# Patient Record
Sex: FEMALE | Race: AMERICAN INDIAN OR ALASKA NATIVE
[De-identification: names, ages, dates, MRNs, and addresses within clinical notes are randomized per-mention and may not be internally consistent; named-entity substitution may affect disease eponyms.]

---

## 2022-01-27 ENCOUNTER — HOSPITAL ENCOUNTER (EMERGENCY)
Dept: HOSPITAL 5 - ED | Age: 52
Discharge: HOME | End: 2022-01-27
Payer: COMMERCIAL

## 2022-01-27 VITALS — SYSTOLIC BLOOD PRESSURE: 146 MMHG | DIASTOLIC BLOOD PRESSURE: 88 MMHG

## 2022-01-27 DIAGNOSIS — K86.89: ICD-10-CM

## 2022-01-27 DIAGNOSIS — R11.2: ICD-10-CM

## 2022-01-27 DIAGNOSIS — K50.00: ICD-10-CM

## 2022-01-27 DIAGNOSIS — F17.200: ICD-10-CM

## 2022-01-27 DIAGNOSIS — R10.13: Primary | ICD-10-CM

## 2022-01-27 LAB
ALBUMIN SERPL-MCNC: 4.1 G/DL (ref 3.9–5)
ALT SERPL-CCNC: 15 UNITS/L (ref 7–56)
BASOPHILS # (AUTO): 0 K/MM3 (ref 0–0.1)
BASOPHILS NFR BLD AUTO: 0.5 % (ref 0–1.8)
BILIRUB UR QL STRIP: (no result)
BLOOD UR QL VISUAL: (no result)
BUN SERPL-MCNC: 10 MG/DL (ref 7–17)
BUN/CREAT SERPL: 17 %
CALCIUM SERPL-MCNC: 9.4 MG/DL (ref 8.4–10.2)
EOSINOPHIL # BLD AUTO: 0 K/MM3 (ref 0–0.4)
EOSINOPHIL NFR BLD AUTO: 0.7 % (ref 0–4.3)
HCT VFR BLD CALC: 44 % (ref 30.3–42.9)
HEMOLYSIS INDEX: 3
HGB BLD-MCNC: 13.8 GM/DL (ref 10.1–14.3)
LYMPHOCYTES # BLD AUTO: 1.2 K/MM3 (ref 1.2–5.4)
LYMPHOCYTES NFR BLD AUTO: 20.8 % (ref 13.4–35)
MCHC RBC AUTO-ENTMCNC: 31 % (ref 30–34)
MCV RBC AUTO: 89 FL (ref 79–97)
MONOCYTES # (AUTO): 0.3 K/MM3 (ref 0–0.8)
MONOCYTES % (AUTO): 5.7 % (ref 0–7.3)
PH UR STRIP: 9 [PH] (ref 5–7)
PLATELET # BLD: 320 K/MM3 (ref 140–440)
PROT UR STRIP-MCNC: (no result) MG/DL
RBC # BLD AUTO: 4.95 M/MM3 (ref 3.65–5.03)
RBC #/AREA URNS HPF: 10 /HPF (ref 0–6)
UROBILINOGEN UR-MCNC: < 2 MG/DL (ref ?–2)
WBC #/AREA URNS HPF: 2 /HPF (ref 0–6)

## 2022-01-27 PROCEDURE — 85025 COMPLETE CBC W/AUTO DIFF WBC: CPT

## 2022-01-27 PROCEDURE — 83690 ASSAY OF LIPASE: CPT

## 2022-01-27 PROCEDURE — 36415 COLL VENOUS BLD VENIPUNCTURE: CPT

## 2022-01-27 PROCEDURE — 99285 EMERGENCY DEPT VISIT HI MDM: CPT

## 2022-01-27 PROCEDURE — 96374 THER/PROPH/DIAG INJ IV PUSH: CPT

## 2022-01-27 PROCEDURE — 96361 HYDRATE IV INFUSION ADD-ON: CPT

## 2022-01-27 PROCEDURE — 93005 ELECTROCARDIOGRAM TRACING: CPT

## 2022-01-27 PROCEDURE — 80053 COMPREHEN METABOLIC PANEL: CPT

## 2022-01-27 PROCEDURE — 81001 URINALYSIS AUTO W/SCOPE: CPT

## 2022-01-27 PROCEDURE — 96375 TX/PRO/DX INJ NEW DRUG ADDON: CPT

## 2022-01-27 PROCEDURE — 96376 TX/PRO/DX INJ SAME DRUG ADON: CPT

## 2022-01-27 PROCEDURE — 74177 CT ABD & PELVIS W/CONTRAST: CPT

## 2022-01-27 PROCEDURE — 93010 ELECTROCARDIOGRAM REPORT: CPT

## 2022-01-27 NOTE — EMERGENCY DEPARTMENT REPORT
ED Abdominal Pain HPI





- General


Chief Complaint: Abdominal Pain


Stated Complaint: ABD PAIN,VOMITING


Time Seen by Provider: 01/27/22 11:18


Source: patient, EMS


Mode of arrival: Stretcher


Limitations: No Limitations





- History of Present Illness


Initial Comments: 





51-year-old female with a past medical history of a "ulcer as a teenager" 

presents to the hospital complaining of severe cramping epigastric pain with 

nausea, vomiting, p.o. intolerance that started several hours prior to arrival. 

Pain was initially 10/10 intensity but currently 8/10 intensity.  Worse 

palpation.  No alleviating factors.  Patient denies diarrhea, fever, 

hematemesis, or dysuria.  Patient is not vaccinated for Covid but did have Covid

infection in the same


Severity scale (0 -10): 10





- Related Data


                                  Previous Rx's











 Medication  Instructions  Recorded  Last Taken  Type


 


Ciprofloxacin HCl 500 mg PO BID #10 tab 01/27/22 Unknown Rx


 


Famotidine [Pepcid] 20 mg PO BID #30 tablet 01/27/22 Unknown Rx


 


Promethazine [Phenergan] 25 mg PO Q6HR PRN #20 tab 01/27/22 Unknown Rx


 


metroNIDAZOLE [Flagyl] 500 mg PO Q12HR #10 tab 01/27/22 Unknown Rx


 


traMADoL [Ultram 50 MG tab] 50 mg PO Q6HR PRN #14 tablet 01/27/22 Unknown Rx











                                    Allergies











Allergy/AdvReac Type Severity Reaction Status Date / Time


 


No Known Allergies Allergy   Unverified 01/27/22 10:57














ED Review of Systems


ROS: 


Stated complaint: ABD PAIN,VOMITING


Other details as noted in HPI





Comment: All other systems reviewed and negative





ED Past Medical Hx





- Past Medical History


Previous Medical History?: No





- Surgical History


Additional Surgical History: Tubal ligation





- Social History


Smoking Status: Current Some Day Smoker


Substance Use Type: None





- Medications


Home Medications: 


                                Home Medications











 Medication  Instructions  Recorded  Confirmed  Last Taken  Type


 


Ciprofloxacin HCl 500 mg PO BID #10 tab 01/27/22  Unknown Rx


 


Famotidine [Pepcid] 20 mg PO BID #30 tablet 01/27/22  Unknown Rx


 


Promethazine [Phenergan] 25 mg PO Q6HR PRN #20 tab 01/27/22  Unknown Rx


 


metroNIDAZOLE [Flagyl] 500 mg PO Q12HR #10 tab 01/27/22  Unknown Rx


 


traMADoL [Ultram 50 MG tab] 50 mg PO Q6HR PRN #14 tablet 01/27/22  Unknown Rx














ED Physical Exam





- General


Limitations: No Limitations





- Other


Other exam information: 





General: Mild distress secondary to pain cramping episode


Head: Atraumatic


Eyes: normal appearance


ENT: Moist mucous membranes


Neck: Normal appearance, no midline tenderness


Chest: Clear to auscultation bilaterally


CV: Regular rate and rhythm


Abdomen: Soft, normal bowel sounds, epigastric tenderness, no rebound or 

guarding


Back: Normal inspection


Extremity: Normal inspection, full range of motion


Neuro: Alert O x 3, no facial asymmetry, speech clear, no gross motor sensory 

deficit


Psych: Appropriate behavior


Skin: No rash





ED Course


                                   Vital Signs











  01/27/22 01/27/22 01/27/22





  10:15 11:05 15:45


 


Temperature 98.6 F  98.3 F


 


Pulse Rate 74  63


 


Respiratory 14  18





Rate   


 


Blood Pressure 162/94  141/94





[Left]   


 


O2 Sat by Pulse 100 95 97





Oximetry   














- Consultations


Consultation #1: 





01/27/22 16:00


case d/w DR, [facility] GI, rec cipro/flagyl and outpt f/u since labs nl and sx 

improved.





ED Medical Decision Making





- Lab Data


Result diagrams: 


                                 01/27/22 13:06





                                 01/27/22 13:06








                                   Lab Results











  01/27/22 01/27/22 01/27/22 Range/Units





  11:09 13:06 13:06 


 


WBC   5.7   (4.5-11.0)  K/mm3


 


RBC   4.95   (3.65-5.03)  M/mm3


 


Hgb   13.8   (10.1-14.3)  gm/dl


 


Hct   44.0 H   (30.3-42.9)  %


 


MCV   89   (79-97)  fl


 


MCH   28   (28-32)  pg


 


MCHC   31   (30-34)  %


 


RDW   13.8   (13.2-15.2)  %


 


Plt Count   320   (140-440)  K/mm3


 


Lymph % (Auto)   20.8   (13.4-35.0)  %


 


Mono % (Auto)   5.7   (0.0-7.3)  %


 


Eos % (Auto)   0.7   (0.0-4.3)  %


 


Baso % (Auto)   0.5   (0.0-1.8)  %


 


Lymph # (Auto)   1.2   (1.2-5.4)  K/mm3


 


Mono # (Auto)   0.3   (0.0-0.8)  K/mm3


 


Eos # (Auto)   0.0   (0.0-0.4)  K/mm3


 


Baso # (Auto)   0.0   (0.0-0.1)  K/mm3


 


Seg Neutrophils %   72.3 H   (40.0-70.0)  %


 


Seg Neutrophils #   4.1   (1.8-7.7)  K/mm3


 


Sodium    137  (137-145)  mmol/L


 


Potassium    3.9  (3.6-5.0)  mmol/L


 


Chloride    98.0  ()  mmol/L


 


Carbon Dioxide    29  (22-30)  mmol/L


 


Anion Gap    14  mmol/L


 


BUN    10  (7-17)  mg/dL


 


Creatinine    0.6  (0.6-1.2)  mg/dL


 


Estimated GFR    > 60  ml/min


 


BUN/Creatinine Ratio    17  %


 


Glucose    109 H  ()  mg/dL


 


Calcium    9.4  (8.4-10.2)  mg/dL


 


Total Bilirubin    0.40  (0.1-1.2)  mg/dL


 


AST    15  (5-40)  units/L


 


ALT    15  (7-56)  units/L


 


Alkaline Phosphatase    91  ()  units/L


 


Total Protein    7.5  (6.3-8.2)  g/dL


 


Albumin    4.1  (3.9-5)  g/dL


 


Albumin/Globulin Ratio    1.2  %


 


Lipase    22  (13-60)  units/L


 


Urine Color  Straw    (Yellow)  


 


Urine Turbidity  Clear    (Clear)  


 


Urine pH  9.0 H    (5.0-7.0)  


 


Ur Specific Gravity  1.006    (1.003-1.030)  


 


Urine Protein  <15 mg/dl    (Negative)  mg/dL


 


Urine Glucose (UA)  Neg    (Negative)  mg/dL


 


Urine Ketones  Neg    (Negative)  mg/dL


 


Urine Blood  Neg    (Negative)  


 


Urine Nitrite  Neg    (Negative)  


 


Urine Bilirubin  Neg    (Negative)  


 


Urine Urobilinogen  < 2.0    (<2.0)  mg/dL


 


Ur Leukocyte Esterase  Neg    (Negative)  


 


Urine WBC (Auto)  2.0    (0.0-6.0)  /HPF


 


Urine RBC (Auto)  10.0    (0.0-6.0)  /HPF














- EKG Data


-: EKG Interpreted by Me


EKG shows normal: sinus rhythm, intervals (qtc 432), QRS complexes (qrsd 85), 

ST-T waves (no stemi)


Rate: normal





- Radiology Data


Radiology results: report reviewed





CT abdomen pelvis w con  


 


 INDICATION / CLINICAL INFORMATION: upper abd pain, n,v  100ml omni 300.  


 


 TECHNIQUE: Axial CT images were obtained through the abdomen and pelvis after 

100 cc of Omnipaque 


300 IV contrast.  All CT scans at this location are performed using CT dose 

reduction for ALARA by 


means of automated exposure control.   


 


 COMPARISON: None available.  


 


 FINDINGS:  


 


 LOWER CHEST: No significant abnormality  


 LIVER: Nonspecific mild hepatomegaly, measuring 19 cm in craniocaudal 

dimension. There is no focal 


hepatic lesion.  


 GALLBLADDER/BILIARY TREE: Gallbladder appears unremarkable by CT. There is no 

biliary dilatation.    


 PANCREAS: Short segment focal prominence of the pancreatic duct at the head, 

measuring 7 mm (series


2 image 58). No downstream pancreatic duct dilatation. Pancreas appears 

otherwise unremarkable. No 


acute inflammation.  


 SPLEEN: No significant abnormality  


 ADRENALS: No significant abnormality  


 KIDNEYS / URETER: No significant abnormality  


 URINARY BLADDER: Bladder is partially decompressed, though grossly 

unremarkable.  


 REPRODUCTIVE ORGANS: No significant abnormality  


 STOMACH / BOWEL: Prominent but nondilated loops of small bowel are present 

within the abdomen. 


There is no abrupt transition to suggest obstruction. Colonic diverticulosis 

without evidence of 


diverticulitis. The appendix is normal in caliber.  


 LYMPH NODES: No significant adenopathy.  


 VASCULATURE: No significant abnormality.   


 OTHER: No free air, free fluid, or focal fluid collection is identified.  


 


 SKELETAL SYSTEM: Degenerative changes of the spine. No acute osseous findings. 

 


 


 IMPRESSION:  


 


 1. Nonspecific prominent but nondilated loops of small bowel within the 

abdomen, likely reflecting 


an acute infectious or inflammatory enteritis. No evidence of obstruction.  


 2. Mild focal prominence of the pancreatic duct at the head of the pancreas. 

Findings could reflect


sequela of prior pancreatitis, though main duct intraductal papillary mucinous 

neoplasm may have a 


similar appearance. Recommend GI consultation with consideration for EUS/ERCP 

and/or MRI/MRCP.  


 3. Other incidental findings as above.  





- Medical Decision Making





51-year-old female presents to the hospital with nausea, vomiting, epigastric 

abdominal pain.  Labs normal.  CT revealing possible bowel inflammation and 

pancreatic duct abnormality needing further outpatient evaluation.  Case 

discussed with GI.  Cipro and Flagyl will be prescribed.  Patient received 1 

dose of Levaquin and Flagyl in the ED.  Symptoms improved with pain medication, 

antiemetics, and H2 blocker.  Patient will be discharged with follow-up


Critical Care Time: No


Critical care attestation.: 


If time is entered above; I have spent that time in minutes in the direct care 

of this critically ill patient, excluding procedure time.








ED Disposition


Clinical Impression: 


 Epigastric pain, Nausea and vomiting, Pancreatic duct dilated, Inflammation of 

small intestine





Disposition: 01 HOME / SELF CARE / HOMELESS


Is pt being admited?: No


Does the pt Need Aspirin: No


Condition: Stable


Instructions:  Abdominal Pain (ED), Nausea and Vomiting, Adult, Abdominal Pain, 

Adult


Additional Instructions: 


Take the medication as prescribed.  Follow-up with your doctor or doctor/clinic 

provided.  Return if symptoms worsen as indicated by your discharge in

structions.


Prescriptions: 


Ciprofloxacin HCl 500 mg PO BID #10 tab


metroNIDAZOLE [Flagyl] 500 mg PO Q12HR #10 tab


Famotidine [Pepcid] 20 mg PO BID #30 tablet


Promethazine [Phenergan] 25 mg PO Q6HR PRN #20 tab


 PRN Reason: Nausea


traMADoL [Ultram 50 MG tab] 50 mg PO Q6HR PRN #14 tablet


 PRN Reason: Pain


Referrals: 


CLARITZA KLINE MD [Primary Care Provider] - 3-5 Days


MANNY JERNIGAN MD [Staff Physician] - 3-5 Days


(GI MD


)


Time of Disposition: 16:34

## 2022-01-27 NOTE — CAT SCAN REPORT
CT abdomen pelvis w con



INDICATION / CLINICAL INFORMATION: upper abd pain, n,v  100ml omni 300.



TECHNIQUE: Axial CT images were obtained through the abdomen and pelvis after 100 cc of Omnipaque 300
 IV contrast.  All CT scans at this location are performed using CT dose reduction for ALARA by means
 of automated exposure control. 



COMPARISON: None available.



FINDINGS:



LOWER CHEST: No significant abnormality

LIVER: Nonspecific mild hepatomegaly, measuring 19 cm in craniocaudal dimension. There is no focal he
patic lesion.

GALLBLADDER/BILIARY TREE: Gallbladder appears unremarkable by CT. There is no biliary dilatation.  

PANCREAS: Short segment focal prominence of the pancreatic duct at the head, measuring 7 mm (series 2
 image 58). No downstream pancreatic duct dilatation. Pancreas appears otherwise unremarkable. No acu
te inflammation.

SPLEEN: No significant abnormality

ADRENALS: No significant abnormality

KIDNEYS / URETER: No significant abnormality

URINARY BLADDER: Bladder is partially decompressed, though grossly unremarkable.

REPRODUCTIVE ORGANS: No significant abnormality

STOMACH / BOWEL: Prominent but nondilated loops of small bowel are present within the abdomen. There 
is no abrupt transition to suggest obstruction. Colonic diverticulosis without evidence of diverticul
itis. The appendix is normal in caliber.

LYMPH NODES: No significant adenopathy.

VASCULATURE: No significant abnormality. 

OTHER: No free air, free fluid, or focal fluid collection is identified.



SKELETAL SYSTEM: Degenerative changes of the spine. No acute osseous findings.



IMPRESSION:



1. Nonspecific prominent but nondilated loops of small bowel within the abdomen, likely reflecting an
 acute infectious or inflammatory enteritis. No evidence of obstruction.

2. Mild focal prominence of the pancreatic duct at the head of the pancreas. Findings could reflect s
equela of prior pancreatitis, though main duct intraductal papillary mucinous neoplasm may have a sim
ilar appearance. Recommend GI consultation with consideration for EUS/ERCP and/or MRI/MRCP.

3. Other incidental findings as above.



Signer Name: Rey Lemus MD 

Signed: 1/27/2022 3:04 PM

Workstation Name: [email or web address]

## 2022-01-28 NOTE — ELECTROCARDIOGRAPH REPORT
[facility]

                                       

Test Date:    2022               Test Time:    14:54:55

Pat Name:     TAMI MARTINEZ       Department:   

Patient ID:   [id number]          Room:          

Gender:       F                        Technician:   jailyn

:          1970               Requested By: TRISTAN ROBLES

Order Number: [id number]              Reading MD:   Nelson Beckford

                                 Measurements

Intervals                              Axis          

Rate:         69                       P:            56

[place]:           180                      QRS:          55

QRSD:         85                       T:            49

QT:           404                                    

QTc:          432                                    

                           Interpretive Statements

Sinus rhythm

nonspecific st-t

No previous ECG available for comparison

Electronically Signed On 2022 10:24:23 EST by Nelson Beckford

## 2022-03-14 ENCOUNTER — OFFICE VISIT (OUTPATIENT)
Dept: URBAN - METROPOLITAN AREA CLINIC 92 | Facility: CLINIC | Age: 52
End: 2022-03-14

## 2022-03-23 ENCOUNTER — OFFICE VISIT (OUTPATIENT)
Dept: URBAN - METROPOLITAN AREA CLINIC 92 | Facility: CLINIC | Age: 52
End: 2022-03-23
Payer: COMMERCIAL

## 2022-03-23 ENCOUNTER — WEB ENCOUNTER (OUTPATIENT)
Dept: URBAN - METROPOLITAN AREA CLINIC 92 | Facility: CLINIC | Age: 52
End: 2022-03-23

## 2022-03-23 ENCOUNTER — TELEPHONE ENCOUNTER (OUTPATIENT)
Dept: URBAN - METROPOLITAN AREA CLINIC 92 | Facility: CLINIC | Age: 52
End: 2022-03-23

## 2022-03-23 VITALS
HEART RATE: 85 BPM | WEIGHT: 180 LBS | BODY MASS INDEX: 28.93 KG/M2 | SYSTOLIC BLOOD PRESSURE: 159 MMHG | HEIGHT: 66 IN | DIASTOLIC BLOOD PRESSURE: 108 MMHG

## 2022-03-23 DIAGNOSIS — Z79.1 NSAID LONG-TERM USE: ICD-10-CM

## 2022-03-23 DIAGNOSIS — R10.9 ABDOMINAL PAIN: ICD-10-CM

## 2022-03-23 DIAGNOSIS — R11.2 N&V (NAUSEA AND VOMITING): ICD-10-CM

## 2022-03-23 DIAGNOSIS — K27.9 PUD (PEPTIC ULCER DISEASE): ICD-10-CM

## 2022-03-23 PROCEDURE — 99204 OFFICE O/P NEW MOD 45 MIN: CPT | Performed by: INTERNAL MEDICINE

## 2022-03-23 RX ORDER — OMEPRAZOLE 40 MG/1
1 CAPSULE 30 MINUTES BEFORE MORNING MEAL CAPSULE, DELAYED RELEASE ORAL ONCE A DAY
Qty: 30 | Refills: 3 | OUTPATIENT
Start: 2022-03-23

## 2022-03-23 NOTE — HPI-TODAY'S VISIT:
2mo ago seen in [facility] ER w:   Abd pain Location epig Quality gassy bloat Frequency x 1 Duration 3hrs Radiation diffuse Severity severe Exacerbating factors eating and drinking Relieving factors cipro flagyl pepcid promethazine tramadol   Assoc with N/V; no add'l episdoes over 2mo  Ct reportedly showed intestinal inflamm and slightly dil PD  told labs wnl   Denies diarrhea constipation hematochezia melena jaundice unintentional wt loss   Denies dyspepsia htbrn dysphagia odynophagia food impaction CP cough anorexia light headedness   Denies scleral icterus, increased abd girth, LE edema, confusion, disorientation, memory loss, hematemesis  from [place] h/o PUD occas uses naprosyn weight stable working and in school

## 2022-07-05 ENCOUNTER — TELEPHONE ENCOUNTER (OUTPATIENT)
Dept: URBAN - METROPOLITAN AREA CLINIC 92 | Facility: CLINIC | Age: 52
End: 2022-07-05

## 2022-07-11 ENCOUNTER — OFFICE VISIT (OUTPATIENT)
Dept: URBAN - METROPOLITAN AREA SURGERY CENTER 16 | Facility: SURGERY CENTER | Age: 52
End: 2022-07-11
Payer: COMMERCIAL

## 2022-07-11 DIAGNOSIS — R10.84 ABDOMINAL CRAMPING, GENERALIZED: ICD-10-CM

## 2022-07-11 DIAGNOSIS — R19.4 ALTERATION IN BOWEL ELIMINATION: ICD-10-CM

## 2022-07-11 DIAGNOSIS — K29.60 ADENOPAPILLOMATOSIS GASTRICA: ICD-10-CM

## 2022-07-11 PROCEDURE — G8907 PT DOC NO EVENTS ON DISCHARG: HCPCS | Performed by: INTERNAL MEDICINE

## 2022-07-11 PROCEDURE — 45378 DIAGNOSTIC COLONOSCOPY: CPT | Performed by: INTERNAL MEDICINE

## 2022-07-11 PROCEDURE — 43239 EGD BIOPSY SINGLE/MULTIPLE: CPT | Performed by: INTERNAL MEDICINE

## 2022-07-11 RX ORDER — OMEPRAZOLE 40 MG/1
1 CAPSULE 30 MINUTES BEFORE MORNING MEAL CAPSULE, DELAYED RELEASE ORAL ONCE A DAY
Qty: 30 | Refills: 3 | Status: ACTIVE | COMMUNITY
Start: 2022-03-23

## 2022-07-15 ENCOUNTER — OFFICE VISIT (OUTPATIENT)
Dept: URBAN - METROPOLITAN AREA TELEHEALTH 2 | Facility: TELEHEALTH | Age: 52
End: 2022-07-15

## 2022-07-15 RX ORDER — OMEPRAZOLE 40 MG/1
1 CAPSULE 30 MINUTES BEFORE MORNING MEAL CAPSULE, DELAYED RELEASE ORAL ONCE A DAY
Qty: 30 | Refills: 3 | COMMUNITY
Start: 2022-03-23

## 2022-07-15 RX ORDER — OMEPRAZOLE 40 MG/1
1 CAPSULE 30 MINUTES BEFORE MORNING MEAL CAPSULE, DELAYED RELEASE ORAL ONCE A DAY
Qty: 30 | Refills: 3 | OUTPATIENT

## 2022-07-15 NOTE — HPI-TODAY'S VISIT:
Wt __# over 4mo (was 180)  7/11/22 EGD Reflux esophagitis Erythematous gastropathy, bxs negative for h.pylori no evidence of PUD Normal duodenum Colonoscopy WNL   Abd pain Location epig Quality gassy bloat Frequency x 1 Duration 3hrs Radiation diffuse Severity severe Exacerbating factors eating and drinking Relieving factors cipro flagyl pepcid promethazine tramadol   Assoc with N/V; no add'l episdoes over 2mo  Ct reportedly showed intestinal inflamm and slightly dil PD  told labs wnl   Denies diarrhea constipation hematochezia melena jaundice unintentional wt loss   Denies dyspepsia htbrn dysphagia odynophagia food impaction CP cough anorexia light headedness   Denies scleral icterus, increased abd girth, LE edema, confusion, disorientation, memory loss, hematemesis  from [place] h/o PUD occas uses naprosyn weight stable working and in school

## 2022-07-28 ENCOUNTER — OFFICE VISIT (OUTPATIENT)
Dept: URBAN - METROPOLITAN AREA TELEHEALTH 2 | Facility: TELEHEALTH | Age: 52
End: 2022-07-28

## 2022-07-28 RX ORDER — OMEPRAZOLE 40 MG/1
1 CAPSULE 30 MINUTES BEFORE MORNING MEAL CAPSULE, DELAYED RELEASE ORAL ONCE A DAY
Qty: 30 | Refills: 3 | COMMUNITY

## 2022-07-28 RX ORDER — OMEPRAZOLE 40 MG/1
1 CAPSULE 30 MINUTES BEFORE MORNING MEAL CAPSULE, DELAYED RELEASE ORAL ONCE A DAY
Qty: 30 | Refills: 3 | OUTPATIENT

## 2022-07-28 NOTE — HPI-TODAY'S VISIT:
Wt __# over 4mo (was 180)  7/11/22 EGD Reflux esophagitis Erythematous gastropathy, bxs negative for h.pylori no evidence of PUD Normal duodenum Colonoscopy WNL   Abd pain Location epig Quality gassy bloat Frequency x 1 Duration 3hrs Radiation diffuse Severity severe Exacerbating factors eating and drinking Relieving factors cipro flagyl pepcid promethazine tramadol   Assoc with N/V; no add'l episdoes over 2mo  Ct reportedly showed intestinal inflamm and slightly dil PD  told labs wnl   Denies diarrhea constipation hematochezia melena jaundice unintentional wt loss   Denies dyspepsia htbrn dysphagia odynophagia food impaction CP cough anorexia light headedness   Denies scleral icterus, increased abd girth, LE edema, confusion, disorientation, memory loss, hematemesis  from [place] h/o PUD occas uses naprosyn weight stable working and in school

## 2022-08-02 ENCOUNTER — DASHBOARD ENCOUNTERS (OUTPATIENT)
Age: 52
End: 2022-08-02

## 2022-08-02 PROBLEM — 13200003 PEPTIC ULCER DISEASE: Status: ACTIVE | Noted: 2022-03-23

## 2022-08-05 ENCOUNTER — OFFICE VISIT (OUTPATIENT)
Dept: URBAN - METROPOLITAN AREA TELEHEALTH 2 | Facility: TELEHEALTH | Age: 52
End: 2022-08-05
Payer: COMMERCIAL

## 2022-08-05 VITALS — BODY MASS INDEX: 26.84 KG/M2 | WEIGHT: 167 LBS | HEIGHT: 66 IN

## 2022-08-05 DIAGNOSIS — Z79.1 NSAID LONG-TERM USE: ICD-10-CM

## 2022-08-05 DIAGNOSIS — R10.9 ABDOMINAL PAIN: ICD-10-CM

## 2022-08-05 DIAGNOSIS — K27.9 PUD (PEPTIC ULCER DISEASE): ICD-10-CM

## 2022-08-05 DIAGNOSIS — R11.2 N&V (NAUSEA AND VOMITING): ICD-10-CM

## 2022-08-05 PROCEDURE — 99213 OFFICE O/P EST LOW 20 MIN: CPT | Performed by: INTERNAL MEDICINE

## 2022-08-05 RX ORDER — OMEPRAZOLE 40 MG/1
1 CAPSULE 30 MINUTES BEFORE MORNING MEAL CAPSULE, DELAYED RELEASE ORAL ONCE A DAY
Qty: 30 | Refills: 3 | COMMUNITY

## 2022-08-05 RX ORDER — OMEPRAZOLE 40 MG/1
1 CAPSULE 30 MINUTES BEFORE MORNING MEAL CAPSULE, DELAYED RELEASE ORAL ONCE A DAY
Qty: 30 | Refills: 3 | OUTPATIENT

## 2022-08-05 NOTE — HPI-TODAY'S VISIT:
Wt decr 13# over 4mo (was 180)  7/11/22 EGD Reflux esophagitis Erythematous gastropathy, bxs negative for h.pylori no evidence of PUD Normal duodenum Colonoscopy WNL   Abd pain N/V have resolved  Ct reportedly showed intestinal inflamm and slightly dil PD  told labs wnl   Denies diarrhea constipation hematochezia melena jaundice unintentional wt loss   Denies dyspepsia htbrn dysphagia odynophagia food impaction CP cough anorexia light headedness   Denies scleral icterus, increased abd girth, LE edema, confusion, disorientation, memory loss, hematemesis  from [place] h/o PUD occas uses naprosyn weight stable working and in school

## 2023-10-22 NOTE — PHYSICAL EXAM CONSTITUTIONAL:
in no acute distress, well developed, well nourished, ambulating without difficulty, normal communication ability PROVIDER:[TOKEN:[[id number]]]